# Patient Record
Sex: FEMALE | Race: WHITE | NOT HISPANIC OR LATINO | Employment: FULL TIME | URBAN - METROPOLITAN AREA
[De-identification: names, ages, dates, MRNs, and addresses within clinical notes are randomized per-mention and may not be internally consistent; named-entity substitution may affect disease eponyms.]

---

## 2017-08-14 ENCOUNTER — OFFICE VISIT (OUTPATIENT)
Dept: PSYCHIATRY | Facility: CLINIC | Age: 47
End: 2017-08-14
Payer: COMMERCIAL

## 2017-08-14 VITALS
WEIGHT: 127 LBS | HEIGHT: 62 IN | BODY MASS INDEX: 23.37 KG/M2 | DIASTOLIC BLOOD PRESSURE: 59 MMHG | SYSTOLIC BLOOD PRESSURE: 116 MMHG | HEART RATE: 74 BPM

## 2017-08-14 DIAGNOSIS — F33.42 RECURRENT MAJOR DEPRESSIVE DISORDER, IN FULL REMISSION: ICD-10-CM

## 2017-08-14 DIAGNOSIS — F41.1 GENERALIZED ANXIETY DISORDER: ICD-10-CM

## 2017-08-14 PROBLEM — F32.5 MAJOR DEPRESSIVE DISORDER IN FULL REMISSION: Status: ACTIVE | Noted: 2017-08-14

## 2017-08-14 PROCEDURE — 99999 PR PBB SHADOW E&M-EST. PATIENT-LVL II: CPT | Mod: PBBFAC,,, | Performed by: PSYCHIATRY & NEUROLOGY

## 2017-08-14 PROCEDURE — 99204 OFFICE O/P NEW MOD 45 MIN: CPT | Mod: S$GLB,,, | Performed by: PSYCHIATRY & NEUROLOGY

## 2017-08-14 RX ORDER — VENLAFAXINE HYDROCHLORIDE 150 MG/1
150 CAPSULE, EXTENDED RELEASE ORAL DAILY
Qty: 30 CAPSULE | Refills: 3 | Status: SHIPPED | OUTPATIENT
Start: 2017-08-14 | End: 2017-10-09 | Stop reason: SDUPTHER

## 2017-08-14 NOTE — PATIENT INSTRUCTIONS
Venlafaxine extended-release capsules  What is this medicine?  VENLAFAXINE(NISHI la fax een) is used to treat depression, anxiety and panic disorder.  How should I use this medicine?  Take this medicine by mouth with a full glass of water. Follow the directions on the prescription label. Do not cut, crush, or chew this medicine. Take it with food. If needed, the capsule may be carefully opened and the entire contents sprinkled on a spoonful of cool applesauce. Swallow the applesauce/pellet mixture right away without chewing and follow with a glass of water to ensure complete swallowing of the pellets. Try to take your medicine at about the same time each day. Do not take your medicine more often than directed. Do not stop taking this medicine suddenly except upon the advice of your doctor. Stopping this medicine too quickly may cause serious side effects or your condition may worsen.  A special MedGuide will be given to you by the pharmacist with each prescription and refill. Be sure to read this information carefully each time.  Talk to your pediatrician regarding the use of this medicine in children. Special care may be needed.  What side effects may I notice from receiving this medicine?  Side effects that you should report to your doctor or health care professional as soon as possible:  · allergic reactions like skin rash, itching or hives, swelling of the face, lips, or tongue  · breathing problems  · changes in vision  · hallucination, loss of contact with reality  · seizures  · suicidal thoughts or other mood changes  · trouble passing urine or change in the amount of urine  · unusual bleeding or bruising  Side effects that usually do not require medical attention (report to your doctor or health care professional if they continue or are bothersome):  · change in sex drive or performance  · constipation  · increased sweating  · loss of appetite  · nausea  · tremors  · weight loss  What may interact with this  medicine?  Do not take this medicine with any of the following medications:  · certain medicines for fungal infections like fluconazole, itraconazole, ketoconazole, posaconazole, voriconazole  · cisapride  · desvenlafaxine  · dofetilide  · dronedarone  · duloxetine  · levomilnacipran  · linezolid  · MAOIs like Carbex, Eldepryl, Marplan, Nardil, and Parnate  · methylene blue (injected into a vein)  · milnacipran  · pimozide  · thioridazine  · ziprasidone  This medicine may also interact with the following medications:  · aspirin and aspirin-like medicines  · certain medicines for depression, anxiety, or psychotic disturbances  · certain medicines for migraine headaches like almotriptan, eletriptan, frovatriptan, naratriptan, rizatriptan, sumatriptan, zolmitriptan  · certain medicines for sleep  · certain medicines that treat or prevent blood clots like dalteparin, enoxaparin, warfarin  · cimetidine  · clozapine  · diuretics  · fentanyl  · furazolidone  · indinavir  · isoniazid  · lithium  · metoprolol  · NSAIDS, medicines for pain and inflammation, like ibuprofen or naproxen  · other medicines that prolong the QT interval (cause an abnormal heart rhythm)  · procarbazine  · rasagiline  · supplements like [place]'s wort, kava kava, valerian  · tramadol  · tryptophan  What if I miss a dose?  If you miss a dose, take it as soon as you can. If it is almost time for your next dose, take only that dose. Do not take double or extra doses.  Where should I keep my medicine?  Keep out of the reach of children.  Store at a controlled temperature between 20 and 25 degrees C (68 degrees and 77 degrees F), in a dry place. Throw away any unused medicine after the expiration date.  What should I tell my health care provider before I take this medicine?  They need to know if you have any of these conditions:  · bleeding disorders  · glaucoma  · heart disease  · high blood pressure  · high cholesterol  · kidney disease  · liver  disease  · low levels of sodium in the blood  · tin or bipolar disorder  · seizures  · suicidal thoughts, plans, or attempt; a previous suicide attempt by you or a family  · take medicines that treat or prevent blood clots  · thyroid disease  · an unusual or allergic reaction to venlafaxine, desvenlafaxine, other medicines, foods, dyes, or preservatives  · pregnant or trying to get pregnant  · breast-feeding  What should I watch for while using this medicine?  Tell your doctor if your symptoms do not get better or if they get worse. Visit your doctor or health care professional for regular checks on your progress. Because it may take several weeks to see the full effects of this medicine, it is important to continue your treatment as prescribed by your doctor.  Patients and their families should watch out for new or worsening thoughts of suicide or depression. Also watch out for sudden changes in feelings such as feeling anxious, agitated, panicky, irritable, hostile, aggressive, impulsive, severely restless, overly excited and hyperactive, or not being able to sleep. If this happens, especially at the beginning of treatment or after a change in dose, call your health care professional.  This medicine can cause an increase in blood pressure. Check with your doctor for instructions on monitoring your blood pressure while taking this medicine.  You may get drowsy or dizzy. Do not drive, use machinery, or do anything that needs mental alertness until you know how this medicine affects you. Do not stand or sit up quickly, especially if you are an older patient. This reduces the risk of dizzy or fainting spells. Alcohol may interfere with the effect of this medicine. Avoid alcoholic drinks.  Your mouth may get dry. Chewing sugarless gum, sucking hard candy and drinking plenty of water will help. Contact your doctor if the problem does not go away or is severe.  Date Last Reviewed:   NOTE:This sheet is a summary. It may  not cover all possible information. If you have questions about this medicine, talk to your doctor, pharmacist, or health care provider. Copyright© 2016 [employer]
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
## 2017-10-09 ENCOUNTER — OFFICE VISIT (OUTPATIENT)
Dept: PSYCHIATRY | Facility: CLINIC | Age: 47
End: 2017-10-09
Payer: COMMERCIAL

## 2017-10-09 VITALS
BODY MASS INDEX: 22.82 KG/M2 | WEIGHT: 124 LBS | DIASTOLIC BLOOD PRESSURE: 55 MMHG | HEIGHT: 62 IN | HEART RATE: 77 BPM | SYSTOLIC BLOOD PRESSURE: 106 MMHG

## 2017-10-09 DIAGNOSIS — F41.1 GENERALIZED ANXIETY DISORDER: Primary | ICD-10-CM

## 2017-10-09 DIAGNOSIS — F33.42 RECURRENT MAJOR DEPRESSIVE DISORDER, IN FULL REMISSION: ICD-10-CM

## 2017-10-09 PROCEDURE — 99213 OFFICE O/P EST LOW 20 MIN: CPT | Mod: S$GLB,,, | Performed by: PSYCHIATRY & NEUROLOGY

## 2017-10-09 PROCEDURE — 99999 PR PBB SHADOW E&M-EST. PATIENT-LVL II: CPT | Mod: PBBFAC,,, | Performed by: PSYCHIATRY & NEUROLOGY

## 2017-10-09 RX ORDER — VENLAFAXINE HYDROCHLORIDE 150 MG/1
150 CAPSULE, EXTENDED RELEASE ORAL DAILY
Qty: 30 CAPSULE | Refills: 3 | Status: SHIPPED | OUTPATIENT
Start: 2017-10-09 | End: 2018-01-02 | Stop reason: SDUPTHER

## 2017-10-09 NOTE — PROGRESS NOTES
"Outpatient Psychiatry Follow-Up Visit (MD/NP)    10/9/2017    Clinical Status of Patient:  Outpatient (Ambulatory)    Chief Complaint:  Antonette Bonilla is a 47 y.o. female who presents today for follow-up of depression and anxiety.  Met with patient.      Interval History and Content of Current Session:  Interim Events/Subjective Report/Content of Current Session: Patient reports that she has been doing "great" since her last visit.  She notes her mood has been stable and her anxiety has been controlled with the Effexor.  She notes that she has been sleeping well without the seroquel.  She denies any irritability, denies any muscle tension, or increased fatigue.  Son is adjusting well to college.  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 She denies any side effects from her medication.    Psychotherapy:  · Target symptoms: anxiety   · Why chosen therapy is appropriate versus another modality: relevant to diagnosis, patient responds to this modality  · Outcome monitoring methods: self-report, observation  · Therapeutic intervention type: behavior modifying psychotherapy, supportive psychotherapy  · Topics discussed/themes: work stress, life stage transitional issues  · The patient's response to the intervention is accepting. The patient's progress toward treatment goals is good.   · Duration of intervention: 6 minutes.    Review of Systems   · PSYCHIATRIC: Pertinant items are noted in the narrative.  · NEUROLOGIC: No weakness, sensory changes, seizures, confusion, memory loss, tremor or other abnormal movements.  · RESPIRATORY: No shortness of breath.  · CARDIOVASCULAR: No tachycardia or chest pain.    Past Medical, Family and Social History: The patient's past medical, family and social history have been reviewed and updated as appropriate within the electronic medical record - see encounter notes.    Compliance: yes    Side effects: None    Risk Parameters:  Patient reports no suicidal " "ideation  Patient reports no homicidal ideation  Patient reports no self-injurious behavior  Patient reports no violent behavior    Exam (detailed: at least 9 elements; comprehensive: all 15 elements)   Constitutional  Vitals:  Most recent vital signs, dated less than 90 days prior to this appointment, were reviewed.   Vitals:    10/09/17 0921   BP: (!) 106/55   Pulse: 77   Weight: 56.2 kg (124 lb)   Height: 5' 2" (1.575 m)        General:  unremarkable, age appropriate     Musculoskeletal  Muscle Strength/Tone:  not examined   Gait & Station:  non-ataxic     Psychiatric  Speech:  no latency; no press   Mood & Affect:  happy, euthymic  congruent and appropriate, full, bright   Thought Process:  normal and logical   Associations:  intact   Thought Content:  normal, no suicidality, no homicidality, delusions, or paranoia   Insight:  intact   Judgement: behavior is adequate to circumstances   Orientation:  grossly intact   Memory: intact for content of interview   Language: grossly intact   Attention Span & Concentration:  able to focus   Fund of Knowledge:  intact and appropriate to age and level of education     Assessment and Diagnosis   Status/Progress: Based on the examination today, the patient's problem(s) is/are well controlled.  New problems have not been presented today.   Co-morbidities, Diagnostic uncertainty and Lack of compliance are not complicating management of the primary condition.  There are no active rule-out diagnoses for this patient at this time.     General Impression:       ICD-10-CM ICD-9-CM   1. Generalized anxiety disorder F41.1 300.02   2. Recurrent major depressive disorder, in full remission F33.42 296.36       Intervention/Counseling/Treatment Plan   · Medication Management: Continue current medications. Effexor 150mg PO qAM.      Return to Clinic: 3 months  "

## 2017-10-15 NOTE — PROGRESS NOTES
STAFF NOTE:  Patient's case was formally presented to me by Dr. Espitia and patient was seen by me in supervision on the same day.  Pt is well known to me from prior psychiatric treatment years ago.  I agree with his assessment and plan as specified to discontinue Seroquel and continue Effexor XR for management of anxiety and depressive Sx.

## 2018-01-02 ENCOUNTER — OFFICE VISIT (OUTPATIENT)
Dept: PSYCHIATRY | Facility: CLINIC | Age: 48
End: 2018-01-02
Payer: COMMERCIAL

## 2018-01-02 VITALS
BODY MASS INDEX: 22.52 KG/M2 | DIASTOLIC BLOOD PRESSURE: 53 MMHG | WEIGHT: 122.38 LBS | HEIGHT: 62 IN | HEART RATE: 78 BPM | SYSTOLIC BLOOD PRESSURE: 115 MMHG

## 2018-01-02 DIAGNOSIS — F33.42 RECURRENT MAJOR DEPRESSIVE DISORDER, IN FULL REMISSION: ICD-10-CM

## 2018-01-02 DIAGNOSIS — F41.1 GENERALIZED ANXIETY DISORDER: ICD-10-CM

## 2018-01-02 PROCEDURE — 99999 PR PBB SHADOW E&M-EST. PATIENT-LVL II: CPT | Mod: PBBFAC,,, | Performed by: PSYCHIATRY & NEUROLOGY

## 2018-01-02 PROCEDURE — 3008F BODY MASS INDEX DOCD: CPT | Mod: CPTII,S$GLB,, | Performed by: PSYCHIATRY & NEUROLOGY

## 2018-01-02 PROCEDURE — 99213 OFFICE O/P EST LOW 20 MIN: CPT | Mod: S$GLB,,, | Performed by: PSYCHIATRY & NEUROLOGY

## 2018-01-02 RX ORDER — VENLAFAXINE HYDROCHLORIDE 150 MG/1
150 CAPSULE, EXTENDED RELEASE ORAL DAILY
Qty: 30 CAPSULE | Refills: 3 | Status: SHIPPED | OUTPATIENT
Start: 2018-01-02 | End: 2018-04-10 | Stop reason: SDUPTHER
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
## 2018-01-23 NOTE — PROGRESS NOTES
STAFF NOTE:  Patient's case was discussed with Dr. Espitia in supervision.  I agree with his assessment and plan as specified to continue Effexor XR for management of depression and generalized anxiety Sx.

## 2018-03-21 ENCOUNTER — OFFICE VISIT (OUTPATIENT)
Dept: OBSTETRICS AND GYNECOLOGY | Facility: CLINIC | Age: 48
End: 2018-03-21
Payer: COMMERCIAL

## 2018-03-21 VITALS
DIASTOLIC BLOOD PRESSURE: 76 MMHG | HEIGHT: 62 IN | WEIGHT: 121.06 LBS | BODY MASS INDEX: 22.28 KG/M2 | SYSTOLIC BLOOD PRESSURE: 126 MMHG

## 2018-03-21 DIAGNOSIS — R10.2 PELVIC PAIN: Primary | ICD-10-CM

## 2018-03-21 DIAGNOSIS — Z12.39 BREAST CANCER SCREENING: ICD-10-CM

## 2018-03-21 DIAGNOSIS — K57.92 DIVERTICULITIS: ICD-10-CM

## 2018-03-21 PROCEDURE — 99999 PR PBB SHADOW E&M-EST. PATIENT-LVL IV: CPT | Mod: PBBFAC,,, | Performed by: STUDENT IN AN ORGANIZED HEALTH CARE EDUCATION/TRAINING PROGRAM

## 2018-03-21 PROCEDURE — 99203 OFFICE O/P NEW LOW 30 MIN: CPT | Mod: S$GLB,,, | Performed by: STUDENT IN AN ORGANIZED HEALTH CARE EDUCATION/TRAINING PROGRAM

## 2018-03-21 PROCEDURE — 87086 URINE CULTURE/COLONY COUNT: CPT

## 2018-03-21 NOTE — PROGRESS NOTES
Chief Complaint: Pelvic pain     HPI:      Antonette Bonilla is a 47 y.o.  who presents today for evaluation of pelvic pain.  LMP: No LMP recorded. Patient has had a hysterectomy.  Patient reports it has been several years since she saw a doctor (since her hysterectomy).  She reports that over the past few months she has started to notice bilateral pelvic pain that is throbbing and intermittent.  She reports relief with tylenol.  Unable to take NSAIDS secondary to ulcers.  She denies any other aggravating or alleviating factors.  She denies any nausea or emesis.  Denies any diarrhea or constipation.  She denies any vaginal bleeding.  She denies any hematuria or bleeding with bowel movements.  She does report some intermittent dysuria a few days ago that has now resolved.  She also reports a history of diverticulitis (diagnosed 3-4 years ago) and is unsure if this is the cause.  Denies any fevers or chills.  No other issues, problems, or complaints.  Ms. Bonilla is currently sexually active with a single male partner.  Denies any pain with intercourse.    Previous Pap: No result found  (s/p hysterectomy with benign pathology)  Previous Mammogram: Negative per patient (2017)  Most Recent Dexa: NA  Colonoscopy: 3-4 years ago and diagnosed with diverticulitis    GYN Hx:  Menarche 12.  Hysterectomy .  Gardasil:  No.   OB History      Para Term  AB Living    3 3 3     3    SAB TAB Ectopic Multiple Live Births            3        History reviewed. No pertinent past medical history.  Past Surgical History:   Procedure Laterality Date    BREAST SURGERY      HYSTERECTOMY       Social History     Social History    Marital status:      Spouse name: N/A    Number of children: N/A    Years of education: N/A     Occupational History    Not on file.     Social History Main Topics    Smoking status: Never Smoker    Smokeless tobacco: Never Used    Alcohol use No    Drug use: No     "Sexual activity: Not on file     Other Topics Concern    Not on file     Social History Narrative    No narrative on file     Family History   Problem Relation Age of Onset    Breast cancer Paternal Aunt     Colon cancer Neg Hx     Ovarian cancer Neg Hx        ROS:     GENERAL: Denies unintentional weight gain or weight loss. Feeling well overall.   SKIN: Denies rash or lesions.   HEENT: Denies headaches, or vision changes.   CARDIOVASCULAR: Denies palpitations or chest pain.   RESPIRATORY: Denies shortness of breath or dyspnea on exertion.  BREASTS: Denies pain, lumps, or nipple discharge.   ABDOMEN: Denies abdominal pain, constipation, diarrhea, nausea, vomiting, change in appetite.  URINARY: Denies frequency, dysuria, hematuria.  NEUROLOGIC: Denies syncope or weakness.   PSYCHIATRIC: Denies depression, anxiety or mood swings.    Physical Exam:      PHYSICAL EXAM:  /76   Ht 5' 2" (1.575 m)   Wt 54.9 kg (121 lb 0.5 oz)   BMI 22.14 kg/m²   Body mass index is 22.14 kg/m².     APPEARANCE: Well nourished, well developed, in no acute distress.  PSYCH: Appropriate mood and affect.  SKIN: No acne or hirsutism.  NECK: Neck symmetric without masses or thyromegaly.  NODES: No inguinal, axillary, or supraclavicular lymph node enlargement.  CHEST: Normal respiratory effort.    CARDIOVASCULAR:  Regular rate and rhythm.  LUNGS:  Clear to auscultation bilaterally.  BREASTS: Symmetrical, no skin changes or visible lesions.  No palpable masses or nipple discharge bilaterally.  ABDOMEN: Soft.  No tenderness or masses.    PELVIC: Normal external genitalia without lesions.  Normal hair distribution.  Adequate perineal body, normal urethral meatus.  Vagina moist and well rugated without lesions or discharge.  Cervix surgically absent.  Vaginal cuff well healed and intact.  Bimanual exam shows uterus surgically absent.  Adnexa without masses or tenderness.    EXTREMITIES: No edema.  No tenderness to " palpation.    Assessment/Plan:     47 y.o.  with pelvic pain, doing well    Pelvic pain  -     US Pelvis Comp with Transvag NON-OB (xpd; Future; Expected date: 2018  -     Urine culture    Breast cancer screening  -     Mammo Digital Diagnostic Bilat with Tomosynthesis_CAD; Future; Expected date: 2018          Counselin.  Pelvic pain:  Discussed multiple etiologies of pelvic pain with patient including urinary, GYN, GI related.  Urine culture sent today and will follow up results.  U/S ordered to evaluate possible GYN etiology.  Will follow up results.  Will also refer to PCP for evaluation and treatment of possible GI component.  All questions answered.  2.  Well woman exam performed today.  Pap smear no longer indicated.  MMG ordered.  Will follow up results.     3.  Follow up with PCP for other health maintenance.  4.  RTC in 1 month for U/S or sooner if needed.    Use of the [email or web address] Patient Portal discussed and encouraged during today's visit.

## 2018-03-23 LAB — BACTERIA UR CULT: NO GROWTH

## 2018-03-28 ENCOUNTER — TELEPHONE (OUTPATIENT)
Dept: OBSTETRICS AND GYNECOLOGY | Facility: CLINIC | Age: 48
End: 2018-03-28

## 2018-03-28 NOTE — TELEPHONE ENCOUNTER
Phone call made to patient regarding negative urine culture results.  No answer.  Voicemail left.

## 2018-04-10 ENCOUNTER — OFFICE VISIT (OUTPATIENT)
Dept: PSYCHIATRY | Facility: CLINIC | Age: 48
End: 2018-04-10
Payer: COMMERCIAL

## 2018-04-10 VITALS
HEART RATE: 82 BPM | WEIGHT: 122.81 LBS | BODY MASS INDEX: 22.6 KG/M2 | DIASTOLIC BLOOD PRESSURE: 57 MMHG | SYSTOLIC BLOOD PRESSURE: 100 MMHG | HEIGHT: 62 IN

## 2018-04-10 DIAGNOSIS — F41.1 GENERALIZED ANXIETY DISORDER: Primary | ICD-10-CM

## 2018-04-10 DIAGNOSIS — F33.42 RECURRENT MAJOR DEPRESSIVE DISORDER, IN FULL REMISSION: ICD-10-CM

## 2018-04-10 PROCEDURE — 3008F BODY MASS INDEX DOCD: CPT | Mod: CPTII,S$GLB,, | Performed by: PSYCHIATRY & NEUROLOGY

## 2018-04-10 PROCEDURE — 99999 PR PBB SHADOW E&M-EST. PATIENT-LVL II: CPT | Mod: PBBFAC,,, | Performed by: PSYCHIATRY & NEUROLOGY

## 2018-04-10 PROCEDURE — 99213 OFFICE O/P EST LOW 20 MIN: CPT | Mod: S$GLB,,, | Performed by: PSYCHIATRY & NEUROLOGY

## 2018-04-10 RX ORDER — VENLAFAXINE HYDROCHLORIDE 150 MG/1
150 CAPSULE, EXTENDED RELEASE ORAL DAILY
Qty: 30 CAPSULE | Refills: 3 | Status: SHIPPED | OUTPATIENT
Start: 2018-04-10 | End: 2018-11-13

## 2018-04-10 NOTE — PROGRESS NOTES
Outpatient Psychiatry Follow-Up Visit (MD/NP)    4/10/2018    Clinical Status of Patient:  Outpatient (Ambulatory)    Chief Complaint:  Antonette Bonilla is a 47 y.o. female who presents today for follow-up of depression and anxiety.  Met with patient.      Interval History and Content of Current Session:  Interim Events/Subjective Report/Content of Current Session: Patient reports that she has been doing well since her last visit.  Her mood and anxiety continue to be well controlled with Effexor 150mg daily.  Currently trying to help her daughter through a depressed period but denies that this has affected her mood.  Eating and sleeping well. Staying busy with work. Continues to enjoy time with her children.  We discussed ways for her to limit her time on her phone as she would like to start reading more.   We also discussed ways for her to become more social outside of work.    Psychotherapy:  · Target symptoms: anxiety   · Why chosen therapy is appropriate versus another modality: relevant to diagnosis, patient responds to this modality  · Outcome monitoring methods: self-report, observation  · Therapeutic intervention type: behavior modifying psychotherapy, supportive psychotherapy  · Topics discussed/themes: work stress, life stage transitional issues  · The patient's response to the intervention is accepting. The patient's progress toward treatment goals is good.   · Duration of intervention: 7 minutes.    Review of Systems   · PSYCHIATRIC: Pertinant items are noted in the narrative.  · RESPIRATORY: No shortness of breath.  · CARDIOVASCULAR: No tachycardia or chest pain.  · GASTROINTESTINAL: No nausea, vomiting, pain, constipation or diarrhea.    Past Medical, Family and Social History: The patient's past medical, family and social history have been reviewed and updated as appropriate within the electronic medical record - see encounter notes.    Compliance: yes    Side effects: None    Risk  "Parameters:  Patient reports no suicidal ideation  Patient reports no homicidal ideation  Patient reports no self-injurious behavior  Patient reports no violent behavior    Exam (detailed: at least 9 elements; comprehensive: all 15 elements)   Constitutional  Vitals:  Most recent vital signs, dated less than 90 days prior to this appointment, were reviewed.   Vitals:    04/10/18 1107   BP: (!) 100/57   Pulse: 82   Weight: 55.7 kg (122 lb 12.7 oz)   Height: 5' 2" (1.575 m)        General:  unremarkable, age appropriate     Musculoskeletal  Muscle Strength/Tone:  not examined   Gait & Station:  non-ataxic     Psychiatric  Speech:  no latency; no press   Mood & Affect:  happy, euthymic  congruent and appropriate, full, bright   Thought Process:  normal and logical   Associations:  intact   Thought Content:  normal, no suicidality, no homicidality, delusions, or paranoia   Insight:  intact   Judgement: behavior is adequate to circumstances   Orientation:  grossly intact   Memory: intact for content of interview   Language: grossly intact, able to name, able to repeat   Attention Span & Concentration:  able to focus   Fund of Knowledge:  intact and appropriate to age and level of education     Assessment and Diagnosis   Status/Progress: Based on the examination today, the patient's problem(s) is/are well controlled.  New problems have not been presented today.   Co-morbidities, Diagnostic uncertainty and Lack of compliance are not complicating management of the primary condition.  There are no active rule-out diagnoses for this patient at this time.     General Impression:       ICD-10-CM ICD-9-CM   1. Generalized anxiety disorder F41.1 300.02   2. Recurrent major depressive disorder, in full remission F33.42 296.36       Intervention/Counseling/Treatment Plan   · Medication Management: Continue current medications. Effexor 150mg PO qAM.  · Discussed diagnosis, risks and benefits of proposed treatment above vs " alternative treatments vs no treatment, and potential side effects of these treatments. The patient expresses understanding of the above and displays the capacity to agree with this treatment given said understanding. Patient also agrees that, currently, the benefits outweigh the risks and would like to pursue treatment at this time.  · Discussed resident transition in July.      Return to Clinic: 3 months

## 2018-04-18 ENCOUNTER — OFFICE VISIT (OUTPATIENT)
Dept: OBSTETRICS AND GYNECOLOGY | Facility: CLINIC | Age: 48
End: 2018-04-18
Payer: COMMERCIAL

## 2018-04-18 VITALS
HEIGHT: 62 IN | SYSTOLIC BLOOD PRESSURE: 104 MMHG | DIASTOLIC BLOOD PRESSURE: 66 MMHG | WEIGHT: 126.31 LBS | BODY MASS INDEX: 23.24 KG/M2

## 2018-04-18 DIAGNOSIS — R10.2 PELVIC PAIN IN FEMALE: Primary | ICD-10-CM

## 2018-04-18 PROCEDURE — 99999 PR PBB SHADOW E&M-EST. PATIENT-LVL III: CPT | Mod: PBBFAC,,, | Performed by: STUDENT IN AN ORGANIZED HEALTH CARE EDUCATION/TRAINING PROGRAM

## 2018-04-18 PROCEDURE — 99499 UNLISTED E&M SERVICE: CPT | Mod: S$GLB,,, | Performed by: STUDENT IN AN ORGANIZED HEALTH CARE EDUCATION/TRAINING PROGRAM

## 2018-04-18 NOTE — PROGRESS NOTES
"Chief Complaint: Pelvic pain     HPI:      Antonette Bonilla is a 47 y.o.  who presents today forfollow up of pelvic pain.  She was initially seen for R sided pelvic pain that has now resolved.  She also is following up with her GI physician for diverticulitis.  She denies any other issues or concerns.    OB History      Para Term  AB Living    3 3 3     3    SAB TAB Ectopic Multiple Live Births            3        Past Medical History:   Diagnosis Date    Depression      Past Surgical History:   Procedure Laterality Date    BREAST SURGERY      HYSTERECTOMY      [facility]     Social History     Social History    Marital status:      Spouse name: N/A    Number of children: N/A    Years of education: N/A     Occupational History    Not on file.     Social History Main Topics    Smoking status: Never Smoker    Smokeless tobacco: Never Used    Alcohol use Yes      Comment: Occational    Drug use: No    Sexual activity: Not Currently     Partners: Male     Birth control/ protection: Surgical      Comment: :     [facility]       Other Topics Concern    Not on file     Social History Narrative    No narrative on file     Family History   Problem Relation Age of Onset    Breast cancer Paternal Aunt     Cancer Paternal Aunt     Lung cancer Paternal Grandfather     Cancer Paternal Grandfather     Lung cancer Maternal Grandmother     Cancer Maternal Grandmother     Colon cancer Neg Hx     Ovarian cancer Neg Hx        ROS:     GENERAL: Denies unintentional weight gain or weight loss. Feeling well overall.   ABDOMEN: Denies abdominal pain, constipation, diarrhea, nausea, vomiting, change in appetite.  URINARY: Denies frequency, dysuria, hematuria.    Physical Exam:      PHYSICAL EXAM:  /66   Ht 5' 2" (1.575 m)   Wt 57.3 kg (126 lb 5.2 oz)   LMP  (LMP Unknown) Comment: [facility]    BMI 23.10 kg/m²   Body mass index is 23.1 kg/m².     APPEARANCE: Well nourished, well " developed, in no acute distress.  PSYCH: Appropriate mood and affect.  SKIN: No acne or hirsutism.    Labs:  U/S  FINDINGS:  Uterus:    Surgically absent.    Right ovary:    Not visualized.    Left ovary:    Not visualized.    Free Fluid:    None.   Impression       Status post hysterectomy.    Ovaries were not visualized.    No abnormal pelvic masses identified.  No free pelvic fluid.       Assessment/Plan:     47 y.o.     Pelvic pain in female          Counselin.  Pelvic pain:  U/S results reviewed with patient and reassurance offered.  No further intervention needed at this time.  Pain resolved.  Patient to continue follow up with PCP and GI.  She will call if symptoms return.  Scheduling mammogram next week.  2.  Follow up with PCP for other health maintenance.  3.  RTC for annual exam or sooner if needed.    Use of the [facility] Patient Portal discussed and encouraged during today's visit.

## 2018-05-24 NOTE — PROGRESS NOTES
STAFF NOTE:  Patient's case was discussed with Dr. Espitia in supervision.  I agree with his assessment and plan as specified to continue Effexor XR for management of HUY and depressive Sx.  I also agree with pt pursuing psychotherapy.

## 2018-11-13 ENCOUNTER — OFFICE VISIT (OUTPATIENT)
Dept: PSYCHIATRY | Facility: CLINIC | Age: 48
End: 2018-11-13
Payer: COMMERCIAL

## 2018-11-13 VITALS
WEIGHT: 123.69 LBS | BODY MASS INDEX: 22.76 KG/M2 | HEART RATE: 86 BPM | HEIGHT: 62 IN | SYSTOLIC BLOOD PRESSURE: 110 MMHG | DIASTOLIC BLOOD PRESSURE: 65 MMHG

## 2018-11-13 DIAGNOSIS — F41.1 GENERALIZED ANXIETY DISORDER: Primary | ICD-10-CM

## 2018-11-13 DIAGNOSIS — F33.42 RECURRENT MAJOR DEPRESSIVE DISORDER, IN FULL REMISSION: ICD-10-CM

## 2018-11-13 PROCEDURE — 99999 PR PBB SHADOW E&M-EST. PATIENT-LVL II: CPT | Mod: PBBFAC,,, | Performed by: STUDENT IN AN ORGANIZED HEALTH CARE EDUCATION/TRAINING PROGRAM

## 2018-11-13 PROCEDURE — 99213 OFFICE O/P EST LOW 20 MIN: CPT | Mod: S$GLB,,, | Performed by: STUDENT IN AN ORGANIZED HEALTH CARE EDUCATION/TRAINING PROGRAM

## 2018-11-13 PROCEDURE — 3008F BODY MASS INDEX DOCD: CPT | Mod: CPTII,S$GLB,, | Performed by: STUDENT IN AN ORGANIZED HEALTH CARE EDUCATION/TRAINING PROGRAM

## 2018-11-13 RX ORDER — VENLAFAXINE HYDROCHLORIDE 150 MG/1
150 CAPSULE, EXTENDED RELEASE ORAL DAILY
Qty: 30 CAPSULE | Refills: 4 | Status: SHIPPED | OUTPATIENT
Start: 2018-11-13 | End: 2019-06-11 | Stop reason: SDUPTHER

## 2018-11-13 NOTE — PROGRESS NOTES
"Outpatient Psychiatry Follow-Up Visit (MD/NP)    11/13/2018    Clinical Status of Patient:  Outpatient (Ambulatory)    Chief Complaint:  Antonette Bonilla is a 48 y.o. female who presents today for follow-up of depression and anxiety.  Previously was a patient of Dr. Espitia; last seen 4/10/18. Chart reviewed. Met with patient.      Current medications  -Effexor 150mg PO qAM    Interval History and Content of Current Session:  Interim Events/Subjective Report/Content of Current Session:  TODAY: PAtient feels that her medication continues to help her with social anxiety and depression. She has bad discontinuation symptoms if she misses a dose so she makes sure compliance is good. Sleep is overall really good, but she takes a 5mg melatonin about every other night with really good results. She also practices deep breathing to relax as well which works. Denies SI/HI/AVH. Anxiety is on average 6/10, mostly related to her son. While she does not feel the need to adjust medication for this she would be interested in psychotherapy.  No panic attacks since divorce ~15 years ago. She likes her current psychiatric drug regimen.     Patient reports that she is currently "suffering a little," because of some recent heated exchanges with her son, who has a diagnosis of ASD and is in his second year of college at [employer]. Additionally she has been thinking about her own childhood, when she used to have a lot of trouble with comprehending her school work and with making friends. Because of this she inquires whether she has Aspburgers's/ASD, given that her son carries that diagnosis. We went over DSM autism criteria, and also discussed the nature of social anxiety, learning disabilities, and other common childhood problems. She recently returned from a visit to [place] for a family birthday, which was pleasant but cold. Travelling with her son was very stressful.  She has not been in psychotherapy in many years and she was " interested in resuming it so that she might discuss how to manage her son as he is growing up.     Reviewed psychiatric history. She has been dealing with depression and social anxiety since age 12, attending [facility] for 18 years. Stable on Effexor for around 4 years. No SA, SI or hospitalization.       Psychotherapy:  · Target symptoms: anxiety   · Why chosen therapy is appropriate versus another modality: relevant to diagnosis, patient responds to this modality  · Outcome monitoring methods: self-report, observation  · Therapeutic intervention type: behavior modifying psychotherapy, supportive psychotherapy  · Topics discussed/themes: work stress, life stage transitional issues, parenting issues  · The patient's response to the intervention is accepting. The patient's progress toward treatment goals is good.   · Duration of intervention: 10 minutes.    Review of Systems   · PSYCHIATRIC: Pertinant items are noted in the narrative.  · RESPIRATORY: No shortness of breath.  · CARDIOVASCULAR: No tachycardia or chest pain.  · GASTROINTESTINAL: No nausea, vomiting, pain, constipation or diarrhea.    Past Medical, Family and Social History: The patient's past medical, family and social history have been reviewed and updated as appropriate within the electronic medical record - see encounter notes.  Works as a  at [employer]. No history of hospitalizations or SA. No tobacco, alcohol, drugs. Has three kids, ages 20, 18, 16. 20 year old son has autism and is in college at [employer]. Lives with her two kids. Just got a puppy.    Compliance: yes    Side effects: None    Risk Parameters:  Patient reports no suicidal ideation  Patient reports no homicidal ideation  Patient reports no self-injurious behavior  Patient reports no violent behavior    Exam (detailed: at least 9 elements; comprehensive: all 15 elements)   Constitutional  Vitals:  Most recent vital signs, dated less than 90 days prior to  "this appointment, were reviewed.   Vitals:    11/13/18 1052   BP: 110/65   Pulse: 86   Weight: 56.1 kg (123 lb 10.9 oz)   Height: 5' 2" (1.575 m)        General:  unremarkable, age appropriate     Musculoskeletal  Muscle Strength/Tone:  not examined   Gait & Station:  non-ataxic     Psychiatric  Speech:  no latency; no press   Mood & Affect:  "suffering a little"  Affect: slightly dysphoric, worried initially, euthymic at end of session   Thought Process:  normal and logical   Associations:  intact   Thought Content:  normal, no suicidality, no homicidality, delusions, or paranoia   Insight:  intact   Judgement: behavior is adequate to circumstances   Orientation:  grossly intact   Memory: intact for content of interview   Language: grossly intact, able to name, able to repeat   Attention Span & Concentration:  able to focus   Fund of Knowledge:  intact and appropriate to age and level of education     Assessment and Diagnosis   Status/Progress: Based on the examination today, the patient's problem(s) is/are well controlled.  New problems have not been presented today.   Co-morbidities, Diagnostic uncertainty and Lack of compliance are not complicating management of the primary condition.  There are no active rule-out diagnoses for this patient at this time.     General Impression:       ICD-10-CM ICD-9-CM   1. Generalized anxiety disorder F41.1 300.02   2. Recurrent major depressive disorder, in full remission F33.42 296.36       Intervention/Counseling/Treatment Plan   ·  Continue current medication, Effexor 150mg PO qAM.  · Encouraged psychotherapy to help with adjustment issues related to son having gone to college, provided referral here plus discussed option to ask her insurance company or visit [email or web address]  · Discussed diagnosis, risks and benefits of proposed treatment above vs alternative treatments vs no treatment, and potential side effects of these treatments. The patient expresses understanding " of the above and displays the capacity to agree with this treatment given said understanding. Patient also agrees that, currently, the benefits outweigh the risks and would like to pursue treatment at this time.        Return to Clinic: 3 months

## 2019-02-12 ENCOUNTER — OFFICE VISIT (OUTPATIENT)
Dept: PSYCHIATRY | Facility: CLINIC | Age: 49
End: 2019-02-12
Payer: COMMERCIAL

## 2019-02-12 VITALS
BODY MASS INDEX: 22.7 KG/M2 | WEIGHT: 123.38 LBS | HEART RATE: 88 BPM | HEIGHT: 62 IN | SYSTOLIC BLOOD PRESSURE: 107 MMHG | DIASTOLIC BLOOD PRESSURE: 55 MMHG

## 2019-02-12 DIAGNOSIS — F33.42 RECURRENT MAJOR DEPRESSIVE DISORDER, IN FULL REMISSION: ICD-10-CM

## 2019-02-12 DIAGNOSIS — F41.1 GENERALIZED ANXIETY DISORDER: Primary | ICD-10-CM

## 2019-02-12 PROCEDURE — 99213 PR OFFICE/OUTPT VISIT, EST, LEVL III, 20-29 MIN: ICD-10-PCS | Mod: S$GLB,,, | Performed by: STUDENT IN AN ORGANIZED HEALTH CARE EDUCATION/TRAINING PROGRAM

## 2019-02-12 PROCEDURE — 99999 PR PBB SHADOW E&M-EST. PATIENT-LVL II: ICD-10-PCS | Mod: PBBFAC,,, | Performed by: STUDENT IN AN ORGANIZED HEALTH CARE EDUCATION/TRAINING PROGRAM

## 2019-02-12 PROCEDURE — 99213 OFFICE O/P EST LOW 20 MIN: CPT | Mod: S$GLB,,, | Performed by: STUDENT IN AN ORGANIZED HEALTH CARE EDUCATION/TRAINING PROGRAM

## 2019-02-12 PROCEDURE — 99999 PR PBB SHADOW E&M-EST. PATIENT-LVL II: CPT | Mod: PBBFAC,,, | Performed by: STUDENT IN AN ORGANIZED HEALTH CARE EDUCATION/TRAINING PROGRAM

## 2019-02-12 PROCEDURE — 3008F BODY MASS INDEX DOCD: CPT | Mod: CPTII,S$GLB,, | Performed by: STUDENT IN AN ORGANIZED HEALTH CARE EDUCATION/TRAINING PROGRAM

## 2019-02-12 PROCEDURE — 3008F PR BODY MASS INDEX (BMI) DOCUMENTED: ICD-10-PCS | Mod: CPTII,S$GLB,, | Performed by: STUDENT IN AN ORGANIZED HEALTH CARE EDUCATION/TRAINING PROGRAM
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
## 2019-02-27 ENCOUNTER — TELEPHONE (OUTPATIENT)
Dept: PSYCHIATRY | Facility: CLINIC | Age: 49
End: 2019-02-27

## 2019-04-12 NOTE — PROGRESS NOTES
STAFF NOTE:  Patient's case was formally presented to me by Dr. Espitia in supervision.  I agree with the assessment and plan as specified.

## 2019-04-30 ENCOUNTER — OFFICE VISIT (OUTPATIENT)
Dept: PSYCHIATRY | Facility: CLINIC | Age: 49
End: 2019-04-30
Payer: COMMERCIAL

## 2019-04-30 DIAGNOSIS — F41.1 GENERALIZED ANXIETY DISORDER: Primary | ICD-10-CM

## 2019-04-30 DIAGNOSIS — F33.42 RECURRENT MAJOR DEPRESSIVE DISORDER, IN FULL REMISSION: ICD-10-CM

## 2019-04-30 PROCEDURE — 90791 PR PSYCHIATRIC DIAGNOSTIC EVALUATION: ICD-10-PCS | Mod: S$GLB,,, | Performed by: SOCIAL WORKER

## 2019-04-30 PROCEDURE — 90791 PSYCH DIAGNOSTIC EVALUATION: CPT | Mod: S$GLB,,, | Performed by: SOCIAL WORKER
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
## 2019-06-11 ENCOUNTER — OFFICE VISIT (OUTPATIENT)
Dept: PSYCHIATRY | Facility: CLINIC | Age: 49
End: 2019-06-11
Payer: COMMERCIAL

## 2019-06-11 VITALS
SYSTOLIC BLOOD PRESSURE: 110 MMHG | HEIGHT: 62 IN | BODY MASS INDEX: 23.08 KG/M2 | WEIGHT: 125.44 LBS | DIASTOLIC BLOOD PRESSURE: 60 MMHG | HEART RATE: 77 BPM

## 2019-06-11 DIAGNOSIS — F41.1 GENERALIZED ANXIETY DISORDER: Primary | ICD-10-CM

## 2019-06-11 DIAGNOSIS — F33.42 RECURRENT MAJOR DEPRESSIVE DISORDER, IN FULL REMISSION: ICD-10-CM

## 2019-06-11 PROCEDURE — 99213 OFFICE O/P EST LOW 20 MIN: CPT | Mod: S$GLB,,, | Performed by: STUDENT IN AN ORGANIZED HEALTH CARE EDUCATION/TRAINING PROGRAM

## 2019-06-11 PROCEDURE — 99213 PR OFFICE/OUTPT VISIT, EST, LEVL III, 20-29 MIN: ICD-10-PCS | Mod: S$GLB,,, | Performed by: STUDENT IN AN ORGANIZED HEALTH CARE EDUCATION/TRAINING PROGRAM

## 2019-06-11 PROCEDURE — 99999 PR PBB SHADOW E&M-EST. PATIENT-LVL II: CPT | Mod: PBBFAC,,, | Performed by: STUDENT IN AN ORGANIZED HEALTH CARE EDUCATION/TRAINING PROGRAM

## 2019-06-11 PROCEDURE — 3008F BODY MASS INDEX DOCD: CPT | Mod: CPTII,S$GLB,, | Performed by: STUDENT IN AN ORGANIZED HEALTH CARE EDUCATION/TRAINING PROGRAM

## 2019-06-11 PROCEDURE — 99999 PR PBB SHADOW E&M-EST. PATIENT-LVL II: ICD-10-PCS | Mod: PBBFAC,,, | Performed by: STUDENT IN AN ORGANIZED HEALTH CARE EDUCATION/TRAINING PROGRAM

## 2019-06-11 PROCEDURE — 3008F PR BODY MASS INDEX (BMI) DOCUMENTED: ICD-10-PCS | Mod: CPTII,S$GLB,, | Performed by: STUDENT IN AN ORGANIZED HEALTH CARE EDUCATION/TRAINING PROGRAM

## 2019-06-11 RX ORDER — VENLAFAXINE HYDROCHLORIDE 150 MG/1
150 CAPSULE, EXTENDED RELEASE ORAL DAILY
Qty: 30 CAPSULE | Refills: 4 | Status: SHIPPED | OUTPATIENT
Start: 2019-06-11 | End: 2019-12-27 | Stop reason: SDUPTHER

## 2019-06-11 NOTE — PROGRESS NOTES
"Outpatient Psychiatry Follow-Up Visit (MD/NP)    6/11/2019    Clinical Status of Patient:  Outpatient (Ambulatory)    Chief Complaint:  Antonette Bonilla is a 48 y.o. female who presents today for follow-up of depression and anxiety.  Last seen 4/30/19. Chart reviewed. Met with patient.      Current medications  -Effexor 150mg PO qAM    Interval History and Content of Current Session:  TODAY: Pt presents 11 minutes late to appointment; vitals obtained after visit    Pt reports she is doing really well with regard to her anxiety and mood. Her son is doing "really well." Still has some anxiety--brandi via deep breathing and meditation, and still thinks Effexor is a dramatic help with depression and her social anxiety, which used to limit her at work. She is sleeping "wonderfully," 7-9h a month. Appetite is good, weight is stable. Exercising regularly (elliptical, bike, weights). She was having some panic attacks at night recently, 2-3 weeks ago for about 3-4 nights, but coped via walking around, breathing deeply. Relates this to her Orthodox beliefs-- suspects the Devil targets humans at night. She prays and things get better. Denies AVH or IOR but has very strong, deep Holiness belief. Depression is well-managed--"I love life." No SI/plan/intention. No hopelessness or worthlessness.     Pt mentions she is concerned about inattention. She tends to be forgetful at work, and her mom even now mentions how forgetful and scattered she was as a kid. Discussed that, since she is concerned for ADHD, that assessment can be done via a longer interview (60 min), with collateral from mom (mom would be ok with a phone call) and potentially psychological testing if dx still unclear. She did not book the longer appointment today though we had mentioned that when she called the clinic a few months ago. She would like to do this with the next provider if possible. Medicine continues to be helpful. Denies any SEs unless she misses " "a dose, in which case she feels a bit lightheaded.   Psychotherapy:  · Target symptoms: anxiety   · Why chosen therapy is appropriate versus another modality: relevant to diagnosis, patient responds to this modality  · Outcome monitoring methods: self-report, observation  · Therapeutic intervention type: behavior modifying psychotherapy, supportive psychotherapy  · Topics discussed/themes: work stress, life stage transitional issues, parenting issues  · The patient's response to the intervention is accepting. The patient's progress toward treatment goals is good.   · Duration of intervention: 9 minutes.    Review of Systems   · PSYCHIATRIC: Pertinant items are noted in the narrative.  · RESPIRATORY: No shortness of breath.  · CARDIOVASCULAR: No tachycardia or chest pain.  · GASTROINTESTINAL: No nausea, vomiting, pain, constipation or diarrhea.    Past Medical, Family and Social History: The patient's past medical, family and social history have been reviewed and updated as appropriate within the electronic medical record - see encounter notes.  Works as a  at [employer]. No history of hospitalizations or SA. No tobacco, alcohol, drugs. Has three kids, ages 20, 18, 16. 20 year old son has autism and is in college at \[employer]\"". Lives with her two kids. Just got a puppy.    Compliance: yes    Side effects: None    Risk Parameters:  Patient reports no suicidal ideation  Patient reports no homicidal ideation  Patient reports no self-injurious behavior  Patient reports no violent behavior    Exam (detailed: at least 9 elements; comprehensive: all 15 elements)   Constitutional  Vitals:  Most recent vital signs, dated less than 90 days prior to this appointment, were reviewed.   Vitals:    06/11/19 1137   BP: 110/60   Pulse: 77   Weight: 56.9 kg (125 lb 7.1 oz)   Height: 5' 2" (1.575 m)   BP always a bit low     General:  unremarkable, age appropriate     Musculoskeletal  Muscle Strength/Tone:  not examined " "  Gait & Station:  non-ataxic     Psychiatric  Speech:  no latency; no press   Mood & Affect:  "good"  Affect: slightly anxious. generaly euthymic   Thought Process:  normal and logical   Associations:  intact   Thought Content:  normal, no suicidality, no homicidality, delusions, or paranoia   Insight:  intact   Judgement: behavior is adequate to circumstances   Orientation:  grossly intact   Memory: intact for content of interview   Language: grossly intact, able to name, able to repeat   Attention Span & Concentration:  able to focus   Fund of Knowledge:  intact and appropriate to age and level of education     Assessment and Diagnosis   Status/Progress: Based on the examination today, the patient's problem(s) is/are adequately but not ideally controlled.  New problems have not been presented today.   Co-morbidities, Diagnostic uncertainty and Lack of compliance are not complicating management of the primary condition.  There are no active rule-out diagnoses for this patient at this time. No changes to regimen today per pt preference; see below.    General Impression:       ICD-10-CM ICD-9-CM   1. Generalized anxiety disorder F41.1 300.02   2. Recurrent major depressive disorder, in full remission F33.42 296.36      R/o social anxiety disorder    R/o ADHD    Intervention/Counseling/Treatment Plan   Anxiety, depression  · Continue current medication, Effexor 150mg PO qAM. Still has some breakthrough anxiety but very reluctant to increase dose of any meds and does well with non-medicine coping strategies.   · Encouraged psychotherapy to help with adjustment issues related to son having  gone to college, has seen Alex Coleman once and wants to go back.     Inattention  · Pt concerned for undiagnosed ADHD because of longstanding forgetfulness and inability to focus; has recently learned about ADHD. Cannot complete full/appropriate ADHD interview today due to short duration of visit. Recommend next visit be " scheduled for 60 minutes to obtain full history and collateral, and then, if dx still unclear, could refer for psychological testing. Symptoms as described are concertinaing for ADHD but also possibly for learning disabilities (difficulty with reading/math comprehension?)       · Discussed diagnosis, risks and benefits of proposed treatment above vs alternative treatments vs no treatment, and potential side effects of these treatments. The patient expresses understanding of the above and displays the capacity to agree with this treatment given said understanding. Patient also agrees that, currently, the benefits outweigh the risks and would like to pursue treatment at this time.        Return to Clinic: 1-2 months or sooner as needed. Discussed resident transition in July 2019. Pt to call for next appointment in first week of June.       Wolfgang Love MD  Resident, [facility]   Pager [phone]

## 2019-09-20 ENCOUNTER — OFFICE VISIT (OUTPATIENT)
Dept: URGENT CARE | Facility: CLINIC | Age: 49
End: 2019-09-20
Payer: COMMERCIAL

## 2019-09-20 VITALS
TEMPERATURE: 97 F | SYSTOLIC BLOOD PRESSURE: 109 MMHG | WEIGHT: 125 LBS | HEIGHT: 62 IN | BODY MASS INDEX: 23 KG/M2 | RESPIRATION RATE: 18 BRPM | OXYGEN SATURATION: 99 % | DIASTOLIC BLOOD PRESSURE: 74 MMHG | HEART RATE: 87 BPM

## 2019-09-20 DIAGNOSIS — J04.0 LARYNGITIS: ICD-10-CM

## 2019-09-20 DIAGNOSIS — J06.9 UPPER RESPIRATORY VIRUS: Primary | ICD-10-CM

## 2019-09-20 LAB
CTP QC/QA: YES
S PYO RRNA THROAT QL PROBE: NEGATIVE

## 2019-09-20 PROCEDURE — 99214 OFFICE O/P EST MOD 30 MIN: CPT | Mod: S$GLB,,, | Performed by: PHYSICIAN ASSISTANT

## 2019-09-20 PROCEDURE — 99214 PR OFFICE/OUTPT VISIT, EST, LEVL IV, 30-39 MIN: ICD-10-PCS | Mod: S$GLB,,, | Performed by: PHYSICIAN ASSISTANT

## 2019-09-20 PROCEDURE — 3008F PR BODY MASS INDEX (BMI) DOCUMENTED: ICD-10-PCS | Mod: CPTII,S$GLB,, | Performed by: PHYSICIAN ASSISTANT

## 2019-09-20 PROCEDURE — 87880 STREP A ASSAY W/OPTIC: CPT | Mod: QW,S$GLB,, | Performed by: PHYSICIAN ASSISTANT

## 2019-09-20 PROCEDURE — 87880 POCT RAPID STREP A: ICD-10-PCS | Mod: QW,S$GLB,, | Performed by: PHYSICIAN ASSISTANT

## 2019-09-20 PROCEDURE — 3008F BODY MASS INDEX DOCD: CPT | Mod: CPTII,S$GLB,, | Performed by: PHYSICIAN ASSISTANT

## 2019-09-20 RX ORDER — BENZONATATE 100 MG/1
100 CAPSULE ORAL 3 TIMES DAILY PRN
Qty: 30 CAPSULE | Refills: 0 | Status: SHIPPED | OUTPATIENT
Start: 2019-09-20 | End: 2019-09-30

## 2019-09-20 RX ORDER — FLUTICASONE PROPIONATE 50 MCG
1 SPRAY, SUSPENSION (ML) NASAL DAILY
Qty: 18.2 ML | Refills: 0 | Status: SHIPPED | OUTPATIENT
Start: 2019-09-20

## 2019-09-20 RX ORDER — PREDNISONE 20 MG/1
20 TABLET ORAL DAILY
Qty: 3 TABLET | Refills: 0 | Status: SHIPPED | OUTPATIENT
Start: 2019-09-20 | End: 2019-09-23

## 2019-09-20 RX ORDER — LEVOCETIRIZINE DIHYDROCHLORIDE 5 MG/1
5 TABLET, FILM COATED ORAL NIGHTLY
Qty: 30 TABLET | Refills: 0 | Status: SHIPPED | OUTPATIENT
Start: 2019-09-20 | End: 2023-07-19

## 2019-09-21 NOTE — PATIENT INSTRUCTIONS
PLEASE READ YOUR DISCHARGE INSTRUCTIONS ENTIRELY AS IT CONTAINS IMPORTANT INFORMATION.  - Rest.    - Drink plenty of fluids.    - Tylenol or Ibuprofen as directed as needed for fever/pain.    - Follow up with your PCP or specialty clinic as directed in the next 1-2 weeks if not improved or as needed.  You can call [phone] to schedule an appointment with the appropriate provider.    - If you were prescribed antibiotics, please take them to completion.  - If you were prescribed a narcotic medication, do not drive or operate heavy equipment or machinery while taking these medications.  - If you  smoke, please stop smoking.  -You must understand that you've received an Urgent Care treatment only and that you may be released before all your medical problems are known or treated. You, the patient, will    arrange for follow up care as instructed.  - Please return to Urgent Care or to the Emergency Department if your symptoms worsen.    Patient aware and verbalized understanding.    Viral Upper Respiratory Illness (Adult)  You have a viral upper respiratory illness (URI), which is another term for the common cold. This illness is contagious during the first few days. It is spread through the air by coughing and sneezing. It may also be spread by direct contact (touching the sick person and then touching your own eyes, nose, or mouth). Frequent handwashing will decrease risk of spread. Most viral illnesses go away within 7 to 10 days with rest and simple home remedies. Sometimes the illness may last for several weeks. Antibiotics will not kill a virus, and they are generally not prescribed for this condition.    Home care  · If symptoms are severe, rest at home for the first 2 to 3 days. When you resume activity, don't let yourself get too tired.  · Avoid being exposed to cigarette smoke (yours or others).  · You may use acetaminophen or ibuprofen to control pain and fever, unless another medicine was prescribed.  (Note: If you have chronic liver or kidney disease, have ever had a stomach ulcer or gastrointestinal bleeding, or are taking blood-thinning medicines, talk with your healthcare provider before using these medicines.) Aspirin should never be given to anyone under 18 years of age who is ill with a viral infection or fever. It may cause severe liver or brain damage.  · Your appetite may be poor, so a light diet is fine. Avoid dehydration by drinking 6 to 8 glasses of fluids per day (water, soft drinks, juices, tea, or soup). Extra fluids will help loosen secretions in the nose and lungs.  · Over-the-counter cold medicines will not shorten the length of time youre sick, but they may be helpful for the following symptoms: cough, sore throat, and nasal and sinus congestion. (Note: Do not use decongestants if you have high blood pressure.)  Follow-up care  Follow up with your healthcare provider, or as advised.  When to seek medical advice  Call your healthcare provider right away if any of these occur:  · Cough with lots of colored sputum (mucus)  · Severe headache; face, neck, or ear pain  · Difficulty swallowing due to throat pain  · Fever of 100.4°F (38°C)  Call 911, or get immediate medical care  Call emergency services right away if any of these occur:  · Chest pain, shortness of breath, wheezing, or difficulty breathing  · Coughing up blood  · Inability to swallow due to throat pain  Date Last Reviewed: 9/13/2015  © 5465-3076 [employer]. [address] [place] [address]. All rights reserved. This information is not intended as a substitute for professional medical care. Always follow your healthcare professional's instructions.        Laryngitis    Laryngitis is a swelling of the tissues around the vocal cords. Symptoms include a hoarse (scratchy) voice. The voice may be lost completely. It may be caused by a viral illness, such as a head or chest cold. It may also be due to overuse and  strain of the voice. Smoking, drinking alcohol, acid reflux, allergies, or inhaling harsh chemicals may also lead to symptoms. This condition will usually resolve in 1-2 weeks.  Home care  · Rest your voice until it recovers. Talk as little as possible. If your symptoms are severe, rest at home for a day or so.  · Breathing cool steam from a humidifier/vaporizer or in a steamy shower may be helpful.  · Drink plenty of fluids to stay well hydrated.  · Do not smoke  Follow-up care  Follow up with your healthcare provider or this facility if you have not improved after one week.  When to seek medical advice  Contact your healthcare provider for any of the following:  · Severe pain with swallowing  · Trouble opening mouth  · Neck swelling, neck pain, or trouble moving neck  · Noisy breathing or trouble breathing  · Fever of 100.4°F (38.ºC) or higher, or as directed by your healthcare provider  · Drooling  · Symptoms do not resolve in 2 weeks  Date Last Reviewed: 4/26/2015  © 3658-2191 [employer]. [address] [place] [address], [place] [address]. All rights reserved. This information is not intended as a substitute for professional medical care. Always follow your healthcare professional's instructions.

## 2020-01-10 ENCOUNTER — OFFICE VISIT (OUTPATIENT)
Dept: PSYCHIATRY | Facility: CLINIC | Age: 50
End: 2020-01-10
Payer: COMMERCIAL

## 2020-01-10 VITALS
BODY MASS INDEX: 23.85 KG/M2 | SYSTOLIC BLOOD PRESSURE: 106 MMHG | WEIGHT: 129.63 LBS | HEART RATE: 94 BPM | DIASTOLIC BLOOD PRESSURE: 59 MMHG | HEIGHT: 62 IN

## 2020-01-10 DIAGNOSIS — F41.1 GENERALIZED ANXIETY DISORDER: ICD-10-CM

## 2020-01-10 DIAGNOSIS — F33.42 RECURRENT MAJOR DEPRESSIVE DISORDER, IN FULL REMISSION: ICD-10-CM

## 2020-01-10 PROCEDURE — 99213 PR OFFICE/OUTPT VISIT, EST, LEVL III, 20-29 MIN: ICD-10-PCS | Mod: S$GLB,,, | Performed by: PSYCHIATRY & NEUROLOGY

## 2020-01-10 PROCEDURE — 99999 PR PBB SHADOW E&M-EST. PATIENT-LVL II: ICD-10-PCS | Mod: PBBFAC,,, | Performed by: PSYCHIATRY & NEUROLOGY

## 2020-01-10 PROCEDURE — 3008F PR BODY MASS INDEX (BMI) DOCUMENTED: ICD-10-PCS | Mod: CPTII,S$GLB,, | Performed by: PSYCHIATRY & NEUROLOGY

## 2020-01-10 PROCEDURE — 3008F BODY MASS INDEX DOCD: CPT | Mod: CPTII,S$GLB,, | Performed by: PSYCHIATRY & NEUROLOGY

## 2020-01-10 PROCEDURE — 99999 PR PBB SHADOW E&M-EST. PATIENT-LVL II: CPT | Mod: PBBFAC,,, | Performed by: PSYCHIATRY & NEUROLOGY

## 2020-01-10 PROCEDURE — 99213 OFFICE O/P EST LOW 20 MIN: CPT | Mod: S$GLB,,, | Performed by: PSYCHIATRY & NEUROLOGY

## 2020-01-10 RX ORDER — VENLAFAXINE HYDROCHLORIDE 150 MG/1
150 CAPSULE, EXTENDED RELEASE ORAL DAILY
Qty: 30 CAPSULE | Refills: 2 | Status: SHIPPED | OUTPATIENT
Start: 2020-01-10 | End: 2020-04-09

## 2020-01-10 RX ORDER — VENLAFAXINE HYDROCHLORIDE 75 MG/1
75 CAPSULE, EXTENDED RELEASE ORAL DAILY
Qty: 30 CAPSULE | Refills: 2 | Status: SHIPPED | OUTPATIENT
Start: 2020-01-10 | End: 2020-04-23 | Stop reason: SDUPTHER

## 2020-01-10 NOTE — PATIENT INSTRUCTIONS
Venlafaxine extended-release capsules  What is this medicine?  VENLAFAXINE(NISHI la fax een) is used to treat depression, anxiety and panic disorder.  How should I use this medicine?  Take this medicine by mouth with a full glass of water. Follow the directions on the prescription label. Do not cut, crush, or chew this medicine. Take it with food. If needed, the capsule may be carefully opened and the entire contents sprinkled on a spoonful of cool applesauce. Swallow the applesauce/pellet mixture right away without chewing and follow with a glass of water to ensure complete swallowing of the pellets. Try to take your medicine at about the same time each day. Do not take your medicine more often than directed. Do not stop taking this medicine suddenly except upon the advice of your doctor. Stopping this medicine too quickly may cause serious side effects or your condition may worsen.  A special MedGuide will be given to you by the pharmacist with each prescription and refill. Be sure to read this information carefully each time.  Talk to your pediatrician regarding the use of this medicine in children. Special care may be needed.  What side effects may I notice from receiving this medicine?  Side effects that you should report to your doctor or health care professional as soon as possible:  · allergic reactions like skin rash, itching or hives, swelling of the face, lips, or tongue  · breathing problems  · changes in vision  · hallucination, loss of contact with reality  · seizures  · suicidal thoughts or other mood changes  · trouble passing urine or change in the amount of urine  · unusual bleeding or bruising  Side effects that usually do not require medical attention (report to your doctor or health care professional if they continue or are bothersome):  · change in sex drive or performance  · constipation  · increased sweating  · loss of appetite  · nausea  · tremors  · weight loss  What may interact with this  medicine?  Do not take this medicine with any of the following medications:  · certain medicines for fungal infections like fluconazole, itraconazole, ketoconazole, posaconazole, voriconazole  · cisapride  · desvenlafaxine  · dofetilide  · dronedarone  · duloxetine  · levomilnacipran  · linezolid  · MAOIs like Carbex, Eldepryl, Marplan, Nardil, and Parnate  · methylene blue (injected into a vein)  · milnacipran  · pimozide  · thioridazine  · ziprasidone  This medicine may also interact with the following medications:  · aspirin and aspirin-like medicines  · certain medicines for depression, anxiety, or psychotic disturbances  · certain medicines for migraine headaches like almotriptan, eletriptan, frovatriptan, naratriptan, rizatriptan, sumatriptan, zolmitriptan  · certain medicines for sleep  · certain medicines that treat or prevent blood clots like dalteparin, enoxaparin, warfarin  · cimetidine  · clozapine  · diuretics  · fentanyl  · furazolidone  · indinavir  · isoniazid  · lithium  · metoprolol  · NSAIDS, medicines for pain and inflammation, like ibuprofen or naproxen  · other medicines that prolong the QT interval (cause an abnormal heart rhythm)  · procarbazine  · rasagiline  · supplements like [place]'s wort, kava kava, valerian  · tramadol  · tryptophan  What if I miss a dose?  If you miss a dose, take it as soon as you can. If it is almost time for your next dose, take only that dose. Do not take double or extra doses.  Where should I keep my medicine?  Keep out of the reach of children.  Store at a controlled temperature between 20 and 25 degrees C (68 degrees and 77 degrees F), in a dry place. Throw away any unused medicine after the expiration date.  What should I tell my health care provider before I take this medicine?  They need to know if you have any of these conditions:  · bleeding disorders  · glaucoma  · heart disease  · high blood pressure  · high cholesterol  · kidney disease  · liver  disease  · low levels of sodium in the blood  · tin or bipolar disorder  · seizures  · suicidal thoughts, plans, or attempt; a previous suicide attempt by you or a family  · take medicines that treat or prevent blood clots  · thyroid disease  · an unusual or allergic reaction to venlafaxine, desvenlafaxine, other medicines, foods, dyes, or preservatives  · pregnant or trying to get pregnant  · breast-feeding  What should I watch for while using this medicine?  Tell your doctor if your symptoms do not get better or if they get worse. Visit your doctor or health care professional for regular checks on your progress. Because it may take several weeks to see the full effects of this medicine, it is important to continue your treatment as prescribed by your doctor.  Patients and their families should watch out for new or worsening thoughts of suicide or depression. Also watch out for sudden changes in feelings such as feeling anxious, agitated, panicky, irritable, hostile, aggressive, impulsive, severely restless, overly excited and hyperactive, or not being able to sleep. If this happens, especially at the beginning of treatment or after a change in dose, call your health care professional.  This medicine can cause an increase in blood pressure. Check with your doctor for instructions on monitoring your blood pressure while taking this medicine.  You may get drowsy or dizzy. Do not drive, use machinery, or do anything that needs mental alertness until you know how this medicine affects you. Do not stand or sit up quickly, especially if you are an older patient. This reduces the risk of dizzy or fainting spells. Alcohol may interfere with the effect of this medicine. Avoid alcoholic drinks.  Your mouth may get dry. Chewing sugarless gum, sucking hard candy and drinking plenty of water will help. Contact your doctor if the problem does not go away or is severe.  NOTE:This sheet is a summary. It may not cover all  possible information. If you have questions about this medicine, talk to your doctor, pharmacist, or health care provider. Copyright© 2017 [employer]

## 2020-01-10 NOTE — PROGRESS NOTES
"Outpatient Psychiatry Follow-Up Visit (MD/NP)    1/10/2020    Clinical Status of Patient:  Outpatient (Ambulatory)    Chief Complaint:  Antonette Bonilla is a 49 y.o. female who presents today for follow-up of depression and anxiety. Previously followed by Dr. Love, last seen 6/11/19. Chart reviewed. Met with patient.      Current medications  - Effexor 150mg PO qAM    Interval History and Content of Current Session:    Today, patient reports that she continues to take her Effexor 150 mg PO daily. Feels medication works well and denies side effects. Mood has been "really good" lately and denies depression since last visit. Does report intermittent anxiety triggered by "being around a lot of people and noises". Attempts to manage anxiety by taking deep breaths. Says she has always felt socially anxious but feels medication, in addition to continue exposure to meeting people through her job has been helping her. No longer finds social anxiety to be impairing to her but has in the past. Does continue to report limited social activities outside of close friends and worries about being judged when meeting new people. Overall says she continues to worry constantly about varied concerns - often about her children, finances. Does report intermittent trouble falling asleep due to worrying, muscle tension, restlessness. Denies irritability.     Sleep variable (between 4-8 hours per night). In addition to anxiety feels internet right before bed also contributing to difficulty falling asleep. Denies issues with daytime energy level, appetite, hopelessness. Does report always feeling "guilty" however (eg about things she isn't able to do for other people) and reports this is a chronic thought pattern for her every since she was young. Concentration improved over last visit and has been started on Adderall 20 mg XR by PCP. Denies any suicidal ideation/homicidal ideation and further denies any plan or intention for self " "harm or harm to to others. Denies AVH.     Patient not exercising at present but would like to start particularly as would like to lose some weight.     Review of Systems   · PSYCHIATRIC: Pertinant items are noted in the narrative.  · NEUROLOGIC: No weakness, sensory changes, seizures, confusion, memory loss, tremor or other abnormal movements.  · RESPIRATORY: No shortness of breath.  · CARDIOVASCULAR: No tachycardia or chest pain.  · GASTROINTESTINAL: No nausea, vomiting, pain, constipation or diarrhea.    Past Medical, Family and Social History: The patient's past medical, family and social history have been reviewed and updated as appropriate within the electronic medical record - see encounter notes.  Works as a  at [employer]. No history of hospitalizations or SA. No tobacco, alcohol, drugs. Has three kids, ages 21, 19, 17. 20 year old son has autism and is in college at [employer]. Lives with her two kids. Just got a puppy.    Compliance: yes    Side effects: None    Risk Parameters:  Patient reports no suicidal ideation  Patient reports no homicidal ideation  Patient reports no self-injurious behavior  Patient reports no violent behavior    Exam (detailed: at least 9 elements; comprehensive: all 15 elements)   Constitutional  Vitals:  Most recent vital signs, dated less than 90 days prior to this appointment, were reviewed.   Vitals:    01/10/20 1557   BP: (!) 106/59   Pulse: 94   Weight: 58.8 kg (129 lb 10.1 oz)   Height: 5' 2" (1.575 m)   BP always a bit low     General:  unremarkable, age appropriate, neatly groomed     Musculoskeletal  Muscle Strength/Tone:  not examined   Gait & Station:  non-ataxic     Psychiatric  Speech:  no latency; no press   Mood & Affect:  "good"  Affect: slightly anxious. Appropriate, reactive    Thought Process:  normal and logical   Associations:  intact   Thought Content:  normal, no suicidality, no homicidality, delusions, or paranoia   Insight:  intact   Judgement: " behavior is adequate to circumstances   Orientation:  grossly intact   Memory: intact for content of interview   Language: grossly intact, able to name, able to repeat   Attention Span & Concentration:  able to focus   Fund of Knowledge:  intact and appropriate to age and level of education     Assessment and Diagnosis   Status/Progress: Based on the examination today, the patient's problem(s) is/are adequately but not ideally controlled.  New problems have not been presented today.   Co-morbidities, Diagnostic uncertainty and Lack of compliance are not complicating management of the primary condition.  There are no active rule-out diagnoses for this patient at this time.    General Impression:       ICD-10-CM ICD-9-CM   1. Generalized anxiety disorder F41.1 300.02   2. Recurrent major depressive disorder, in full remission F33.42 296.36      R/o social anxiety disorder  R/o ADHD    Intervention/Counseling/Treatment Plan   Anxiety, depression  · Increase to Effexor 225 mg PO qAM to address ongoing anxiety sxs  · Recommended patient start psychotherapy and provided instructions on making an appointment in this department vs through insurance.   · Encouraged routine aerobic exercise for potential benefit to both mood and general health   · Counseled on sleep hygiene     · Discussed diagnosis, risks and benefits of proposed treatment above vs alternative treatments vs no treatment, and potential side effects of these treatments. The patient expresses understanding of the above and displays the capacity to agree with this treatment given said understanding. Patient also agrees that, currently, the benefits outweigh the risks and would like to pursue treatment at this time. For Effexor, discussed side effects including but not limited to GI side effects (nausea, diarrhea, constipation), sexual side effects in men or women, sleepiness, increased blood pressure, discontinuation symptoms, increased suicidal ideation and  serotonin syndrome.      Return to Clinic: 1-2 months or sooner as needed.    Case to be discussed with Dr. Vargas.     Dai Bryant MD   Psychiatry PGY-3  1/10/2020

## 2020-01-24 NOTE — PROGRESS NOTES
STAFF COMMENTS: I have discussed pt with Dr. Bryant and reviewed the history and exam. I agree and concur with the assessment and plan.

## 2020-02-07 ENCOUNTER — TELEPHONE (OUTPATIENT)
Dept: PSYCHIATRY | Facility: CLINIC | Age: 50
End: 2020-02-07

## 2020-02-07 NOTE — TELEPHONE ENCOUNTER
Called patient per request to discuss her medications. Patient reports she was diagnosed with hypothyroidism by her PCP and started on levothyroxine 25 mcg last week. She says since starting the medication she has noticed a significant increase in anxiety and jitteriness. Also feels her mood has worsened over the past few days. Instructed patient to discuss with primary care doctor vs schedule appointment with PCP as she may need repeat labs or other workup. Patient expresses understanding.

## 2020-02-27 ENCOUNTER — OFFICE VISIT (OUTPATIENT)
Dept: PSYCHIATRY | Facility: CLINIC | Age: 50
End: 2020-02-27
Payer: COMMERCIAL

## 2020-02-27 VITALS
HEIGHT: 63 IN | SYSTOLIC BLOOD PRESSURE: 121 MMHG | HEART RATE: 90 BPM | DIASTOLIC BLOOD PRESSURE: 57 MMHG | WEIGHT: 129.75 LBS | BODY MASS INDEX: 22.99 KG/M2

## 2020-02-27 DIAGNOSIS — F41.1 GENERALIZED ANXIETY DISORDER: Primary | ICD-10-CM

## 2020-02-27 PROCEDURE — 99213 OFFICE O/P EST LOW 20 MIN: CPT | Mod: S$GLB,,, | Performed by: PSYCHIATRY & NEUROLOGY

## 2020-02-27 PROCEDURE — 99213 PR OFFICE/OUTPT VISIT, EST, LEVL III, 20-29 MIN: ICD-10-PCS | Mod: S$GLB,,, | Performed by: PSYCHIATRY & NEUROLOGY

## 2020-02-27 PROCEDURE — 3008F BODY MASS INDEX DOCD: CPT | Mod: CPTII,S$GLB,, | Performed by: PSYCHIATRY & NEUROLOGY

## 2020-02-27 PROCEDURE — 3008F PR BODY MASS INDEX (BMI) DOCUMENTED: ICD-10-PCS | Mod: CPTII,S$GLB,, | Performed by: PSYCHIATRY & NEUROLOGY

## 2020-02-27 PROCEDURE — 99999 PR PBB SHADOW E&M-EST. PATIENT-LVL II: CPT | Mod: PBBFAC,,, | Performed by: PSYCHIATRY & NEUROLOGY

## 2020-02-27 PROCEDURE — 99999 PR PBB SHADOW E&M-EST. PATIENT-LVL II: ICD-10-PCS | Mod: PBBFAC,,, | Performed by: PSYCHIATRY & NEUROLOGY

## 2020-02-27 RX ORDER — DEXTROAMPHETAMINE SACCHARATE, AMPHETAMINE ASPARTATE MONOHYDRATE, DEXTROAMPHETAMINE SULFATE AND AMPHETAMINE SULFATE 1.25; 1.25; 1.25; 1.25 MG/1; MG/1; MG/1; MG/1
5 CAPSULE, EXTENDED RELEASE ORAL DAILY
Qty: 30 CAPSULE | Refills: 0 | Status: SHIPPED | OUTPATIENT
Start: 2020-03-25 | End: 2020-04-30

## 2020-02-27 RX ORDER — DEXTROAMPHETAMINE SACCHARATE, AMPHETAMINE ASPARTATE MONOHYDRATE, DEXTROAMPHETAMINE SULFATE AND AMPHETAMINE SULFATE 2.5; 2.5; 2.5; 2.5 MG/1; MG/1; MG/1; MG/1
10 CAPSULE, EXTENDED RELEASE ORAL EVERY MORNING
Qty: 30 CAPSULE | Refills: 0 | Status: SHIPPED | OUTPATIENT
Start: 2020-03-25 | End: 2020-04-24

## 2020-02-27 RX ORDER — DEXTROAMPHETAMINE SACCHARATE, AMPHETAMINE ASPARTATE MONOHYDRATE, DEXTROAMPHETAMINE SULFATE AND AMPHETAMINE SULFATE 2.5; 2.5; 2.5; 2.5 MG/1; MG/1; MG/1; MG/1
10 CAPSULE, EXTENDED RELEASE ORAL EVERY MORNING
Qty: 30 CAPSULE | Refills: 0 | Status: SHIPPED | OUTPATIENT
Start: 2020-02-27 | End: 2020-02-27

## 2020-02-27 NOTE — PROGRESS NOTES
"Outpatient Psychiatry Follow-Up Visit (MD/NP)    2/27/2020    Clinical Status of Patient:  Outpatient (Ambulatory)    Chief Complaint:  Antonette Bonilla is a 49 y.o. female who presents today for follow-up of depression and anxiety. Previously followed by Dr. Love, last seen 6/11/19. Chart reviewed. Met with patient.      Current medications  - Effexor 150mg PO qAM    Interval History and Content of Current Session:    Today, patient reports that she had a bad reaction to her adderall XR says it is making her very anxious. Describes a feeling of euphoria when taking the medication followed by anxiety/jitteriness and worsening of her mood when the medication is wearing off. No longer feels that the levothyroxine is causing these sxs as she stopped the medication two weeks ago without significant change. Adderall XR 20 mg PO daily is currently prescribed by her PCP for poor concentration but patient has never had ADHD evaluation by psychiatry or formal ADHD testing. Says she was "very happy" before she started the adderall and wishes she had not started it. Discussed with patient plan to taper adderall XR and then evaluate concentration after medication has been stopped. Also discussed that hypothyroidism can contribute to difficulty concentrating and encouraged patient to follow up with PCP promptly regarding hypothyroidism. Patient reports she is going for repeat labs today.     Patient repots that sleep has been pretty good ~ 8 hours per night, appetite is good.Feels concentration has been poor without adderall. Denies any suicidal ideation/homicidal ideation and further denies any plan or intention for self harm or harm to to others. Denies AVH.     Says she plans to work on exercising but things have been very busy over [place] season    Review of Systems   · PSYCHIATRIC: Pertinant items are noted in the narrative.  · NEUROLOGIC: endorses dizziness/ lightheadedness on evenings she takes adderall when " "medication effect is wearing away  · RESPIRATORY: No shortness of breath.  · CARDIOVASCULAR: No tachycardia or chest pain.  · GASTROINTESTINAL: No nausea, vomiting, pain, constipation or diarrhea.    Past Medical, Family and Social History: The patient's past medical, family and social history have been reviewed and updated as appropriate within the electronic medical record - see encounter notes.  Works as a  at [employer]. No history of hospitalizations or SA. No tobacco, alcohol, drugs. Has three kids, ages 21, 19, 17. 20 year old son has autism and is in college at [employer]. Lives with her two kids.    Compliance: yes    Side effects: None    Risk Parameters:  Patient reports no suicidal ideation  Patient reports no homicidal ideation  Patient reports no self-injurious behavior  Patient reports no violent behavior    Exam (detailed: at least 9 elements; comprehensive: all 15 elements)   Constitutional  Vitals:  Most recent vital signs, dated less than 90 days prior to this appointment, were reviewed.   Vitals:    02/27/20 0807   BP: (!) 121/57   Pulse: 90   Weight: 58.8 kg (129 lb 11.9 oz)   Height: 5' 3" (1.6 m)   BP always a bit low     General:  unremarkable, age appropriate, neatly groomed     Musculoskeletal  Muscle Strength/Tone:  not examined   Gait & Station:  non-ataxic     Psychiatric  Speech:  no latency; no press   Mood & Affect:  "gloomy"  Affect: anxious. Appropriate, reactive    Thought Process:  normal and logical   Associations:  intact   Thought Content:  normal, no suicidality, no homicidality, delusions, or paranoia   Insight:  intact   Judgement: behavior is adequate to circumstances   Orientation:  grossly intact   Memory: intact for content of interview   Language: grossly intact, able to name, able to repeat   Attention Span & Concentration:  able to focus   Fund of Knowledge:  intact and appropriate to age and level of education     Assessment and Diagnosis   Status/Progress: " Based on the examination today, the patient's problem(s) is/are inadequately controlled.  New problems have not been presented today.   Co-morbidities are complicating management of the primary condition.  There are no active rule-out diagnoses for this patient at this time.    General Impression:   No diagnosis found.   HUY   MDD, recurrent, in remission  R/o social anxiety disorder  R/o ADHD    Intervention/Counseling/Treatment Plan   Anxiety, depression  · Continue Effexor 225 mg PO qAM to address ongoing anxiety sxs  · Taper Adderall XR to 10 mg PO daily x 1 month, followed by 5 mg PO daily x 1 month. Discussed plan to evaluate focus/concentration in hour-long visit after medication has been stopped and hypothyroidism addressed. Prescriptions provided.   · Again recommended patient start psychotherapy  · Encouraged routine aerobic exercise for potential benefit to both mood and general health     Medication Management:   Discussed diagnosis, risks and benefits of proposed treatment above vs alternative treatments vs no treatment, and potential side effects of these treatments. The patient expresses understanding of the above and displays the capacity to agree with this treatment given said understanding. Patient also agrees that, currently, the benefits outweigh the risks and would like to pursue treatment at this time. For Adderal XR discussed risks including but not limited to insomnia, decreased appetite, anxiety, increase in HR/ tachycardia, increase in BP/HTN, sudden cardiac death, abuse potential/addiction    Patient instructions:   · Keep future appointments and take medications as prescribed.   · Abstain from substance use.   · In the event of an emergency, including suicidal ideation, patient was advised to go to the emergency room.    Return to clinic: 4 weeks or sooner PRN    Case to be discussed with psychiatry attending, Dr. Hong Bryant MD   [facility],  PGY-3  2/27/2020

## 2020-04-30 ENCOUNTER — OFFICE VISIT (OUTPATIENT)
Dept: PSYCHIATRY | Facility: CLINIC | Age: 50
End: 2020-04-30
Payer: COMMERCIAL

## 2020-04-30 DIAGNOSIS — F33.42 RECURRENT MAJOR DEPRESSIVE DISORDER, IN FULL REMISSION: ICD-10-CM

## 2020-04-30 DIAGNOSIS — F41.1 GENERALIZED ANXIETY DISORDER: Primary | ICD-10-CM

## 2020-04-30 PROCEDURE — 99443 PR PHYSICIAN TELEPHONE EVALUATION 21-30 MIN: ICD-10-PCS | Mod: 95,,, | Performed by: PSYCHIATRY & NEUROLOGY

## 2020-04-30 PROCEDURE — 99443 PR PHYSICIAN TELEPHONE EVALUATION 21-30 MIN: CPT | Mod: 95,,, | Performed by: PSYCHIATRY & NEUROLOGY

## 2020-04-30 RX ORDER — VENLAFAXINE HYDROCHLORIDE 75 MG/1
75 CAPSULE, EXTENDED RELEASE ORAL DAILY
Qty: 30 CAPSULE | Refills: 2 | Status: SHIPPED | OUTPATIENT
Start: 2020-04-30 | End: 2020-07-29

## 2020-04-30 RX ORDER — VENLAFAXINE HYDROCHLORIDE 150 MG/1
150 CAPSULE, EXTENDED RELEASE ORAL DAILY
Qty: 30 CAPSULE | Refills: 2 | Status: SHIPPED | OUTPATIENT
Start: 2020-04-30 | End: 2020-07-29

## 2020-04-30 NOTE — PATIENT INSTRUCTIONS
Venlafaxine extended-release capsules  What is this medicine?  VENLAFAXINE(NISHI la fax een) is used to treat depression, anxiety and panic disorder.  How should I use this medicine?  Take this medicine by mouth with a full glass of water. Follow the directions on the prescription label. Do not cut, crush, or chew this medicine. Take it with food. If needed, the capsule may be carefully opened and the entire contents sprinkled on a spoonful of cool applesauce. Swallow the applesauce/pellet mixture right away without chewing and follow with a glass of water to ensure complete swallowing of the pellets. Try to take your medicine at about the same time each day. Do not take your medicine more often than directed. Do not stop taking this medicine suddenly except upon the advice of your doctor. Stopping this medicine too quickly may cause serious side effects or your condition may worsen.  A special MedGuide will be given to you by the pharmacist with each prescription and refill. Be sure to read this information carefully each time.  Talk to your pediatrician regarding the use of this medicine in children. Special care may be needed.  What side effects may I notice from receiving this medicine?  Side effects that you should report to your doctor or health care professional as soon as possible:  · allergic reactions like skin rash, itching or hives, swelling of the face, lips, or tongue  · breathing problems  · changes in vision  · hallucination, loss of contact with reality  · seizures  · suicidal thoughts or other mood changes  · trouble passing urine or change in the amount of urine  · unusual bleeding or bruising  Side effects that usually do not require medical attention (report to your doctor or health care professional if they continue or are bothersome):  · change in sex drive or performance  · constipation  · increased sweating  · loss of appetite  · nausea  · tremors  · weight loss  What may interact with this  medicine?  Do not take this medicine with any of the following medications:  · certain medicines for fungal infections like fluconazole, itraconazole, ketoconazole, posaconazole, voriconazole  · cisapride  · desvenlafaxine  · dofetilide  · dronedarone  · duloxetine  · levomilnacipran  · linezolid  · MAOIs like Carbex, Eldepryl, Marplan, Nardil, and Parnate  · methylene blue (injected into a vein)  · milnacipran  · pimozide  · thioridazine  · ziprasidone  This medicine may also interact with the following medications:  · aspirin and aspirin-like medicines  · certain medicines for depression, anxiety, or psychotic disturbances  · certain medicines for migraine headaches like almotriptan, eletriptan, frovatriptan, naratriptan, rizatriptan, sumatriptan, zolmitriptan  · certain medicines for sleep  · certain medicines that treat or prevent blood clots like dalteparin, enoxaparin, warfarin  · cimetidine  · clozapine  · diuretics  · fentanyl  · furazolidone  · indinavir  · isoniazid  · lithium  · metoprolol  · NSAIDS, medicines for pain and inflammation, like ibuprofen or naproxen  · other medicines that prolong the QT interval (cause an abnormal heart rhythm)  · procarbazine  · rasagiline  · supplements like Sea Girt's wort, kava kava, valerian  · tramadol  · tryptophan  What if I miss a dose?  If you miss a dose, take it as soon as you can. If it is almost time for your next dose, take only that dose. Do not take double or extra doses.  Where should I keep my medicine?  Keep out of the reach of children.  Store at a controlled temperature between 20 and 25 degrees C (68 degrees and 77 degrees F), in a dry place. Throw away any unused medicine after the expiration date.  What should I tell my health care provider before I take this medicine?  They need to know if you have any of these conditions:  · bleeding disorders  · glaucoma  · heart disease  · high blood pressure  · high cholesterol  · kidney disease  · liver  disease  · low levels of sodium in the blood  · tin or bipolar disorder  · seizures  · suicidal thoughts, plans, or attempt; a previous suicide attempt by you or a family  · take medicines that treat or prevent blood clots  · thyroid disease  · an unusual or allergic reaction to venlafaxine, desvenlafaxine, other medicines, foods, dyes, or preservatives  · pregnant or trying to get pregnant  · breast-feeding  What should I watch for while using this medicine?  Tell your doctor if your symptoms do not get better or if they get worse. Visit your doctor or health care professional for regular checks on your progress. Because it may take several weeks to see the full effects of this medicine, it is important to continue your treatment as prescribed by your doctor.  Patients and their families should watch out for new or worsening thoughts of suicide or depression. Also watch out for sudden changes in feelings such as feeling anxious, agitated, panicky, irritable, hostile, aggressive, impulsive, severely restless, overly excited and hyperactive, or not being able to sleep. If this happens, especially at the beginning of treatment or after a change in dose, call your health care professional.  This medicine can cause an increase in blood pressure. Check with your doctor for instructions on monitoring your blood pressure while taking this medicine.  You may get drowsy or dizzy. Do not drive, use machinery, or do anything that needs mental alertness until you know how this medicine affects you. Do not stand or sit up quickly, especially if you are an older patient. This reduces the risk of dizzy or fainting spells. Alcohol may interfere with the effect of this medicine. Avoid alcoholic drinks.  Your mouth may get dry. Chewing sugarless gum, sucking hard candy and drinking plenty of water will help. Contact your doctor if the problem does not go away or is severe.  NOTE:This sheet is a summary. It may not cover all  possible information. If you have questions about this medicine, talk to your doctor, pharmacist, or health care provider. Copyright© 2017 [employer]

## 2021-02-09 ENCOUNTER — OFFICE VISIT (OUTPATIENT)
Dept: PSYCHIATRY | Facility: CLINIC | Age: 51
End: 2021-02-09
Payer: COMMERCIAL

## 2021-02-09 VITALS
HEIGHT: 62 IN | BODY MASS INDEX: 24.56 KG/M2 | SYSTOLIC BLOOD PRESSURE: 107 MMHG | HEART RATE: 77 BPM | WEIGHT: 133.5 LBS | DIASTOLIC BLOOD PRESSURE: 56 MMHG

## 2021-02-09 DIAGNOSIS — F41.1 GENERALIZED ANXIETY DISORDER: Primary | ICD-10-CM

## 2021-02-09 PROCEDURE — 99212 PR OFFICE/OUTPT VISIT, EST, LEVL II, 10-19 MIN: ICD-10-PCS | Mod: S$GLB,,, | Performed by: STUDENT IN AN ORGANIZED HEALTH CARE EDUCATION/TRAINING PROGRAM

## 2021-02-09 PROCEDURE — 99212 OFFICE O/P EST SF 10 MIN: CPT | Mod: S$GLB,,, | Performed by: STUDENT IN AN ORGANIZED HEALTH CARE EDUCATION/TRAINING PROGRAM

## 2021-02-09 PROCEDURE — 99999 PR PBB SHADOW E&M-EST. PATIENT-LVL II: ICD-10-PCS | Mod: PBBFAC,,, | Performed by: STUDENT IN AN ORGANIZED HEALTH CARE EDUCATION/TRAINING PROGRAM

## 2021-02-09 PROCEDURE — 99999 PR PBB SHADOW E&M-EST. PATIENT-LVL II: CPT | Mod: PBBFAC,,, | Performed by: STUDENT IN AN ORGANIZED HEALTH CARE EDUCATION/TRAINING PROGRAM

## 2021-02-09 RX ORDER — VENLAFAXINE HYDROCHLORIDE 75 MG/1
CAPSULE, EXTENDED RELEASE ORAL
Qty: 10 CAPSULE | Refills: 0 | Status: SHIPPED | OUTPATIENT
Start: 2021-02-09 | End: 2021-05-13 | Stop reason: SDUPTHER

## 2021-02-09 RX ORDER — FLUOXETINE HYDROCHLORIDE 20 MG/1
20 CAPSULE ORAL DAILY
Qty: 60 CAPSULE | Refills: 0 | Status: SHIPPED | OUTPATIENT
Start: 2021-02-09 | End: 2021-05-12

## 2021-04-05 NOTE — PROGRESS NOTES
"  Outpatient Psychiatry Telephone Follow-Up Visit (MD/NP)    4/30/2020    Clinical Status of Patient:  Outpatient (Ambulatory)    Chief Complaint:  Antonette Bonilla is a 49 y.o. female who presents today for follow-up of depression and anxiety. Last seen 2/27/20. Chart reviewed. Called patient by phone.      TELEPHONE VISIT  Disclaimer   *This is a telephonic visit which was performed without the use of video technology due to patient inability to connect with video. The patient was informed despite using HIPPA compliant technology there may be risks including security breach, technological failure, inability to perform a comprehensive physical exam which could delay or prevent an accurate diagnosis, and potential complications from treatment decisions rendered over a telemedical platform. The patient understands and consented to the use of tele-health service as being a safe measure to mitigate during COVID Crisis.  The patient was also informed of the relationship between the physician and patient and the respective role of any other health care provider with respect to management of the patient; and notified that the pt may decline to receive medical services by telemedicine and may withdraw from such care at any time.     Patient's Current location, exact physical address: [address] [place] [address], [facility]  In Case of Emergency pts next of kin:  Name: Andrew Wright  Phone number:  [phone]  Visit type: Audio only   (unable to perform Virtual visit with synchronous audio and video)  Total time spent with patient: 27 minutes    Current medications  - Effexor 225mg PO qAM    Interval History and Content of Current Session:    Today, patient reports she is doing "pretty good". Reports that she ran out of her Effexor 150 mg capsules but still had her 75 mg capsules. Because of this has been taking on 150 mg PO daily (2 75s) instead of 225 mg PO daily for the past 4 days. Has not experienced " ----- Message from Dana Xavier sent at 4/5/2021  7:51 AM CDT -----  Regarding: Approved for ONB  Charisse is approved for ONB injection scheduled for 4/8/2021.     "withdrawal effects due to the temporary decrease. Says that when she tapered to Adderall 5 mg PO daily, she experienced worsening of concentration and worsening of her mood and anxiety. Called her PCP for a refill and is now taking Adderall 20 mg capsules again prescribed by PCP. Feels she did best on the 10 mg dose and will request a decrease from PCP.     Discussed with patient that she has not been diagnosed with ADHD by psychiatry and that would need to have a more formal evaluation in this office before prescribing was done her (would require a 1 hr visit and a urine drug screen). Patient expresses understanding and says that she will continue to work with her PCP at present.     Patient does report recently experiencing flank/side pain, nausea and vomiting and had an abdominal U/S. Says that it is likely she had a kidney stone but a cyst in liver was also seen on imaging. Is feeling better now but going for a follow up CT scan this week. Following with PCP and a gastroenterologist for workup.     Patient does report some increased anxiety about health but otherwise says she has been holding up "really well" in terms of mood an anxiety. Reports she has been enjoying staying at home and working in the garden/ yard. Also trying to exercise more by walking. Not working at present (works at her brother's [employer]) but will liekly be going back soon.  Sleeping "really well" lately, good daytime energy level ( "wonderful" energy), good appetite, good concentration with adderall, denies feelings of guilt/ hoplessness. Denies any suicidal ideation/homicidal ideation and further denies any plan or intention for self harm or harm to to others. Denies AVH.     Review of Systems   · PSYCHIATRIC: Pertinant items are noted in the narrative.  · CONSTITUTIONAL: No fevers/ chills, No weight change   · ENT: denies sore throat  · RESPIRATORY: No cough, SOB  · CARDIOVASCULAR: No tachycardia or chest pain.  · GASTROINTESTINAL: " "Did have flank/side pain associated with nausea/ vomiting, now resolved. Following with PCP and has CT scan this week.     Past Medical, Family and Social History: The patient's past medical, family and social history have been reviewed and updated as appropriate within the electronic medical record - see encounter notes.  Works as a  at [email or web address]. No history of hospitalizations or SA. No tobacco, alcohol, drugs. Has three kids, ages 21, 19, 17. 20 year old son has autism and is in college at [employer]. Lives with her two kids.    Compliance: yes    Side effects: None    Risk Parameters:  Patient reports no suicidal ideation  Patient reports no homicidal ideation  Patient reports no self-injurious behavior  Patient reports no violent behavior    Exam (detailed: at least 9 elements; comprehensive: all 15 elements)   Constitutional  Vitals:  Most recent vital signs, dated less than 90 days prior to this appointment, were reviewed.   There were no vitals filed for this visit.BP always a bit low     General:  unable to assess      Musculoskeletal  Muscle Strength/Tone:  not examined   Gait & Station:  unable to assess      Psychiatric  Speech:  no latency; no press   Mood & Affect:  "8-9/10"  Affect: anxious. Appropriate, reactive    Thought Process:  normal and logical   Associations:  intact   Thought Content:  normal, no suicidality, no homicidality, delusions, or paranoia   Insight:  intact   Judgement: behavior is adequate to circumstances   Orientation:  grossly intact   Memory: intact for content of interview   Language: grossly intact, able to name, able to repeat   Attention Span & Concentration:  able to focus   Fund of Knowledge:  intact and appropriate to age and level of education     Assessment and Diagnosis   Status/Progress: Based on the examination today, the patient's problem(s) is/are inadequately controlled.  New problems have not been presented today.   Co-morbidities are complicating " management of the primary condition.  There are no active rule-out diagnoses for this patient at this time.    General Impression:   No diagnosis found.   HUY   MDD, recurrent, in remission  R/o social anxiety disorder  R/o ADHD    Intervention/Counseling/Treatment Plan   Anxiety, depression  · Continue Effexor 225 mg PO qAM to address ongoing anxiety sxs; refills sent   · Patient stopped taper that was prescribed by this provider and has gone back up to Adderall 20 mg PO daily, prescribed by her PCP (though is considering decreasing back down to 10 mg). Discussed as above that patient would need diagnostic evaluation for ADHD before medication could be prescribed in this office.   · Encouraged routine aerobic exercise for potential benefit to both mood and general health     Medication Management:   Discussed diagnosis, risks and benefits of proposed treatment above vs alternative treatments vs no treatment, and potential side effects of these treatments. The patient expresses understanding of the above and displays the capacity to agree with this treatment given said understanding. Patient also agrees that, currently, the benefits outweigh the risks and would like to pursue treatment at this time.    Patient instructions:   · Keep future appointments and take medications as prescribed.   · Abstain from substance use.   · In the event of an emergency, including suicidal ideation, patient was advised to go to the emergency room.    Return to clinic: 1-2 months,  or sooner PRN    Case to be discussed with psychiatry attending, Dr. Hong Bryant MD   [facility] Psychiatry, PGY-3  4/30/2020

## 2021-07-27 ENCOUNTER — OFFICE VISIT (OUTPATIENT)
Dept: URGENT CARE | Facility: CLINIC | Age: 51
End: 2021-07-27
Payer: COMMERCIAL

## 2021-07-27 VITALS
OXYGEN SATURATION: 95 % | TEMPERATURE: 99 F | HEART RATE: 99 BPM | RESPIRATION RATE: 20 BRPM | DIASTOLIC BLOOD PRESSURE: 65 MMHG | SYSTOLIC BLOOD PRESSURE: 109 MMHG | HEIGHT: 63 IN | WEIGHT: 130 LBS | BODY MASS INDEX: 23.04 KG/M2

## 2021-07-27 DIAGNOSIS — J04.0 LARYNGITIS: Primary | ICD-10-CM

## 2021-07-27 DIAGNOSIS — R09.82 POSTNASAL DRIP: ICD-10-CM

## 2021-07-27 DIAGNOSIS — J02.9 SORE THROAT: ICD-10-CM

## 2021-07-27 LAB
CTP QC/QA: YES
MOLECULAR STREP A: NEGATIVE

## 2021-07-27 PROCEDURE — 3008F BODY MASS INDEX DOCD: CPT | Mod: CPTII,S$GLB,, | Performed by: PHYSICIAN ASSISTANT

## 2021-07-27 PROCEDURE — 99214 OFFICE O/P EST MOD 30 MIN: CPT | Mod: S$GLB,,, | Performed by: PHYSICIAN ASSISTANT

## 2021-07-27 PROCEDURE — 1159F MED LIST DOCD IN RCRD: CPT | Mod: CPTII,S$GLB,, | Performed by: PHYSICIAN ASSISTANT

## 2021-07-27 PROCEDURE — 87651 STREP A DNA AMP PROBE: CPT | Mod: QW,S$GLB,, | Performed by: PHYSICIAN ASSISTANT

## 2021-07-27 PROCEDURE — 1160F RVW MEDS BY RX/DR IN RCRD: CPT | Mod: CPTII,S$GLB,, | Performed by: PHYSICIAN ASSISTANT

## 2021-07-27 PROCEDURE — 87651 POCT STREP A MOLECULAR: ICD-10-PCS | Mod: QW,S$GLB,, | Performed by: PHYSICIAN ASSISTANT

## 2021-07-27 PROCEDURE — 1159F PR MEDICATION LIST DOCUMENTED IN MEDICAL RECORD: ICD-10-PCS | Mod: CPTII,S$GLB,, | Performed by: PHYSICIAN ASSISTANT

## 2021-07-27 PROCEDURE — 1126F PR PAIN SEVERITY QUANTIFIED, NO PAIN PRESENT: ICD-10-PCS | Mod: CPTII,S$GLB,, | Performed by: PHYSICIAN ASSISTANT

## 2021-07-27 PROCEDURE — 3008F PR BODY MASS INDEX (BMI) DOCUMENTED: ICD-10-PCS | Mod: CPTII,S$GLB,, | Performed by: PHYSICIAN ASSISTANT

## 2021-07-27 PROCEDURE — 99214 PR OFFICE/OUTPT VISIT, EST, LEVL IV, 30-39 MIN: ICD-10-PCS | Mod: S$GLB,,, | Performed by: PHYSICIAN ASSISTANT

## 2021-07-27 PROCEDURE — 1160F PR REVIEW ALL MEDS BY PRESCRIBER/CLIN PHARMACIST DOCUMENTED: ICD-10-PCS | Mod: CPTII,S$GLB,, | Performed by: PHYSICIAN ASSISTANT

## 2021-07-27 PROCEDURE — 1126F AMNT PAIN NOTED NONE PRSNT: CPT | Mod: CPTII,S$GLB,, | Performed by: PHYSICIAN ASSISTANT

## 2021-07-27 RX ORDER — DESVENLAFAXINE SUCCINATE 50 MG/1
50 TABLET, EXTENDED RELEASE ORAL
COMMUNITY
End: 2022-02-10